# Patient Record
Sex: FEMALE | Race: WHITE | NOT HISPANIC OR LATINO | Employment: FULL TIME | URBAN - METROPOLITAN AREA
[De-identification: names, ages, dates, MRNs, and addresses within clinical notes are randomized per-mention and may not be internally consistent; named-entity substitution may affect disease eponyms.]

---

## 2017-01-12 ENCOUNTER — TRANSCRIBE ORDERS (OUTPATIENT)
Dept: ADMINISTRATIVE | Facility: HOSPITAL | Age: 57
End: 2017-01-12

## 2017-01-17 ENCOUNTER — GENERIC CONVERSION - ENCOUNTER (OUTPATIENT)
Dept: OTHER | Facility: OTHER | Age: 57
End: 2017-01-17

## 2017-02-06 ENCOUNTER — ALLSCRIPTS OFFICE VISIT (OUTPATIENT)
Dept: OTHER | Facility: OTHER | Age: 57
End: 2017-02-06

## 2017-02-08 ENCOUNTER — LAB CONVERSION - ENCOUNTER (OUTPATIENT)
Dept: OTHER | Facility: OTHER | Age: 57
End: 2017-02-08

## 2017-02-08 LAB — ENDOMETRIAL BIOPSY (HISTORICAL): NORMAL

## 2017-02-09 ENCOUNTER — GENERIC CONVERSION - ENCOUNTER (OUTPATIENT)
Dept: OTHER | Facility: OTHER | Age: 57
End: 2017-02-09

## 2017-03-11 ENCOUNTER — HOSPITAL ENCOUNTER (OUTPATIENT)
Dept: RADIOLOGY | Facility: HOSPITAL | Age: 57
Discharge: HOME | End: 2017-03-11
Payer: COMMERCIAL

## 2017-03-11 DIAGNOSIS — Z12.31 ENCOUNTER FOR SCREENING MAMMOGRAM FOR MALIGNANT NEOPLASM OF BREAST: ICD-10-CM

## 2017-03-11 PROCEDURE — G0202 SCR MAMMO BI INCL CAD: HCPCS

## 2017-03-13 DIAGNOSIS — R92.8 OTHER ABNORMAL AND INCONCLUSIVE FINDINGS ON DIAGNOSTIC IMAGING OF BREAST: ICD-10-CM

## 2017-03-16 ENCOUNTER — GENERIC CONVERSION - ENCOUNTER (OUTPATIENT)
Dept: OTHER | Facility: OTHER | Age: 57
End: 2017-03-16

## 2017-03-31 ENCOUNTER — HOSPITAL ENCOUNTER (OUTPATIENT)
Dept: RADIOLOGY | Facility: HOSPITAL | Age: 57
Discharge: HOME | End: 2017-03-31
Payer: COMMERCIAL

## 2017-03-31 DIAGNOSIS — R92.8 MAMMOGRAM ABNORMAL: ICD-10-CM

## 2017-03-31 PROCEDURE — 76642 ULTRASOUND BREAST LIMITED: CPT

## 2017-03-31 PROCEDURE — G0204 DX MAMMO INCL CAD BI: HCPCS

## 2017-04-24 ENCOUNTER — GENERIC CONVERSION - ENCOUNTER (OUTPATIENT)
Dept: OTHER | Facility: OTHER | Age: 57
End: 2017-04-24

## 2017-08-23 ENCOUNTER — TRANSCRIBE ORDERS (OUTPATIENT)
Dept: ADMINISTRATIVE | Facility: HOSPITAL | Age: 57
End: 2017-08-23

## 2017-08-23 DIAGNOSIS — R92.8 ABNORMAL MAMMOGRAM, UNSPECIFIED: ICD-10-CM

## 2017-10-27 ENCOUNTER — HOSPITAL ENCOUNTER (OUTPATIENT)
Dept: RADIOLOGY | Facility: HOSPITAL | Age: 57
Discharge: HOME | End: 2017-10-27
Payer: COMMERCIAL

## 2017-10-27 DIAGNOSIS — R92.8 ABNORMAL MAMMOGRAM: ICD-10-CM

## 2017-10-27 PROCEDURE — G0206 DX MAMMO INCL CAD UNI: HCPCS

## 2017-10-27 PROCEDURE — 76642 ULTRASOUND BREAST LIMITED: CPT

## 2018-01-10 NOTE — MISCELLANEOUS
Message   Recorded as Task   Date: 03/13/2017 09:23 AM, Created By: Logan Schirmer   Task Name: Care Gap   Assigned To: Anali Felton   Regarding Patient: Demetra Gonzalez, Status: In Progress   Comment:    Jeannette Paulino - 13 Mar 2017 9:23 AM     TASK CREATED  Patient needs additional imaging for mammo  Thanks! [facility] - 13 Mar 2017 10:16 AM     TASK IN PROGRESS   [facility] - 13 Mar 2017 10:20 AM     TASK EDITED  order in allscripts, lft message for pt to call w/ results   [facility] - 16 Mar 2017 3:28 PM     TASK EDITED  pt aware apt made        Active Problems    1  Abnormal female pelvic exam (793 5) (Z01 411)   2  Abnormal finding on breast imaging (793 89) (R92 8)   3  Anemia (285 9) (D64 9)   4  Congenital pes planus of left foot (754 61) (Q66 52)   5  Congenital pes planus of right foot (754 61) (Q66 51)   6  Encounter for gynecological examination without abnormal finding (V72 31) (Z01 419)   7  Encounter for screening mammogram for malignant neoplasm of breast (V76 12)   (Z12 31)   8  Endometrial polyp (621 0) (N84 0)   9  Left foot pain (729 5) (M79 672)   10  Menorrhagia (626 2) (N92 0)   11  Right foot pain (729 5) (M79 671)   12  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)   13  Valgus deformity of right great toe (735 0) (M20 11)   14  Vertigo (780 4) (R42)    Current Meds   1  Deborah Low Dose 81 MG Oral Tablet Chewable; Therapy: 50Iem5841 to Recorded   2  Folic Acid 1 MG Oral Tablet; Therapy: 38Zrp9555 to Recorded   3  Meclizine HCl - 12 5 MG Oral Tablet; 1 PO BID; Therapy: 17XDW6915 to (Last Rx:21Oct2015) Ordered   4  Vitamin D 400 UNIT CAPS; Therapy: 74Hhm2560 to Recorded    Allergies    1  No Known Drug Allergies    2   No Known Latex Allergies    Signatures   Electronically signed by : Marianela Mcnamara, ; Mar 16 2017  3:28PM EST                       (Author)

## 2018-01-11 NOTE — MISCELLANEOUS
Message   Recorded as Task   Date: 01/17/2017 12:28 PM, Created By: Vanessa Stone   Task Name: Call Back   Assigned To: Vicki Andrews   Regarding Patient: Radha Maya, Status: In Progress   Comment:    Shari Crocker - 17 Jan 2017 12:28 PM     TASK CREATED  Caller: Self; Results Inquiry; [phone] (Home)  Pt calling for results from Dec bloodwork   Lv Pride - 17 Jan 2017 12:53 PM     TASK IN [place] - 17 Jan 2017 12:56 PM     TASK EDITED   Humaira Welch - 17 Jan 2017 3:27 PM     TASK IN PROGRESS   Humaira Welch - 17 Jan 2017 3:32 PM     TASK EDITED  lm on vm that cbc/diff wnl    tcb with any further questions        Active Problems    1  Abnormal female pelvic exam (793 5) (Z01 411)   2  Anemia (285 9) (D64 9)   3  Congenital pes planus of left foot (754 61) (Q66 52)   4  Congenital pes planus of right foot (754 61) (Q66 51)   5  Encounter for gynecological examination without abnormal finding (V72 31) (Z01 419)   6  Encounter for screening mammogram for malignant neoplasm of breast (V76 12)   (Z12 31)   7  Endometrial polyp (621 0) (N84 0)   8  Left foot pain (729 5) (M79 672)   9  Menorrhagia (626 2) (N92 0)   10  Right foot pain (729 5) (M79 671)   11  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)   12  Valgus deformity of right great toe (735 0) (M20 11)   13  Vertigo (780 4) (R42)    Current Meds   1  Deborah Low Dose 81 MG Oral Tablet Chewable; Therapy: 27Xxd3367 to Recorded   2  Folic Acid 1 MG Oral Tablet; Therapy: 72Slh9579 to Recorded   3  Meclizine HCl - 12 5 MG Oral Tablet; 1 PO BID; Therapy: 57LDM8218 to (Last Rx:21Oct2015) Ordered   4  Vitamin D 400 UNIT CAPS; Therapy: 65Qns9751 to Recorded    Allergies    1  No Known Drug Allergies    2   No Known Latex Allergies    Signatures   Electronically signed by : Jones Bentley, ; Jan 17 2017  3:32PM EST                       (Author)

## 2018-01-12 NOTE — MISCELLANEOUS
Message   Recorded as Task   Date: 04/24/2017 02:54 PM, Created By: Ying Vieira   Task Name: Call Back   Assigned To: Gaye Patel GYN,Team   Regarding Patient: Lawson Arellano, Status: In Progress   Comment:    AlejandrosummerloveShari - 24 Apr 2017 2:54 PM     TASK CREATED  Caller: Self; Results Inquiry; [phone] (Home)  Pt would like to speak with a nurse about the results of recent mammo  Pt got a call about scheduling again in 6 mo's and pt is confused as to why  Linda Story - 24 Apr 2017 2:57 PM     TASK IN PROGRESS   Linda Story - 24 Apr 2017 2:58 PM     TASK EDITED  LMOM to cb    Linda Story - 24 Apr 2017 3:44 PM     TASK EDITED  Explained the reason why she has to go back in 6 months  Wanted to know why it had to be a mammo an not an u/s - stated it was due to insurances purposes  Active Problems    1  Abnormal female pelvic exam (793 5) (Z01 411)   2  Abnormal finding on breast imaging (793 89) (R92 8)   3  Anemia (285 9) (D64 9)   4  Congenital pes planus of left foot (754 61) (Q66 52)   5  Congenital pes planus of right foot (754 61) (Q66 51)   6  Encounter for gynecological examination without abnormal finding (V72 31) (Z01 419)   7  Encounter for screening mammogram for malignant neoplasm of breast (V76 12)   (Z12 31)   8  Endometrial polyp (621 0) (N84 0)   9  Left foot pain (729 5) (M79 672)   10  Menorrhagia (626 2) (N92 0)   11  Right foot pain (729 5) (M79 671)   12  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)   13  Valgus deformity of right great toe (735 0) (M20 11)   14  Vertigo (780 4) (R42)    Current Meds   1  Deborah Low Dose 81 MG Oral Tablet Chewable; Therapy: 16Npj4343 to Recorded   2  Folic Acid 1 MG Oral Tablet; Therapy: 45Ake6195 to Recorded   3  Meclizine HCl - 12 5 MG Oral Tablet; 1 PO BID; Therapy: 07LDD4555 to (Last Rx:21Oct2015) Ordered   4  Vitamin D 400 UNIT CAPS; Therapy: 09Kdb5392 to Recorded    Allergies    1  No Known Drug Allergies    2  No Known Latex Allergies    Signatures   Electronically signed by : Mohit Hendricks, ; Apr 24 2017  3:44PM EST                       (Author)

## 2018-01-13 NOTE — MISCELLANEOUS
Message   Recorded as Task   Date: 02/08/2017 02:03 PM, Created By: [address]   Task Name: Follow Up   Assigned To: Gregory Rodrigues   Regarding Patient: Dorothy De La Rosa, Status: In Progress   Comment:    JeffersonJeannette - 08 Feb 2017 2:03 PM     TASK CREATED  LM for patient about D&C results - she had a benign polyp! Nothing further to do in terms of follow up  Routine care moving forward  Hayden Hawkins - 08 Feb 2017 3:01 PM     TASK IN PROGRESS   Hayden Hawkins - 09 Feb 2017 9:05 AM     TASK EDITED  Pt aware        Active Problems    1  Abnormal female pelvic exam (793 5) (Z01 411)   2  Anemia (285 9) (D64 9)   3  Congenital pes planus of left foot (754 61) (Q66 52)   4  Congenital pes planus of right foot (754 61) (Q66 51)   5  Encounter for gynecological examination without abnormal finding (V72 31) (Z01 419)   6  Encounter for screening mammogram for malignant neoplasm of breast (V76 12)   (Z12 31)   7  Endometrial polyp (621 0) (N84 0)   8  Left foot pain (729 5) (M79 672)   9  Menorrhagia (626 2) (N92 0)   10  Right foot pain (729 5) (M79 671)   11  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

## 2018-03-08 ENCOUNTER — OFFICE VISIT (OUTPATIENT)
Dept: OBGYN CLINIC | Facility: CLINIC | Age: 58
End: 2018-03-08
Payer: COMMERCIAL

## 2018-03-08 VITALS
HEIGHT: 66 IN | DIASTOLIC BLOOD PRESSURE: 80 MMHG | WEIGHT: 129.4 LBS | SYSTOLIC BLOOD PRESSURE: 110 MMHG | BODY MASS INDEX: 20.79 KG/M2

## 2018-03-08 DIAGNOSIS — Z12.31 ENCOUNTER FOR SCREENING MAMMOGRAM FOR MALIGNANT NEOPLASM OF BREAST: ICD-10-CM

## 2018-03-08 DIAGNOSIS — Z01.419 ENCOUNTER FOR ANNUAL ROUTINE GYNECOLOGICAL EXAMINATION: Primary | ICD-10-CM

## 2018-03-08 PROBLEM — R92.8 ABNORMAL FINDING ON BREAST IMAGING: Status: ACTIVE | Noted: 2017-03-13

## 2018-03-08 PROCEDURE — G0145 SCR C/V CYTO,THINLAYER,RESCR: HCPCS | Performed by: PATHOLOGY

## 2018-03-08 PROCEDURE — 88141 CYTOPATH C/V INTERPRET: CPT | Performed by: PATHOLOGY

## 2018-03-08 PROCEDURE — 99396 PREV VISIT EST AGE 40-64: CPT | Performed by: OBSTETRICS & GYNECOLOGY

## 2018-03-08 PROCEDURE — 87624 HPV HI-RISK TYP POOLED RSLT: CPT | Performed by: OBSTETRICS & GYNECOLOGY

## 2018-03-08 RX ORDER — ASPIRIN 81 MG/1
TABLET, CHEWABLE ORAL
COMMUNITY
Start: 2013-08-23 | End: 2019-09-03 | Stop reason: ALTCHOICE

## 2018-03-09 NOTE — PROGRESS NOTES
Assessment/Plan:    No problem-specific Assessment & Plan notes found for this encounter  Diagnoses and all orders for this visit:    Encounter for screening mammogram for malignant neoplasm of breast  -     Cancel: Mammo screening bilateral w cad; Future  -     Cancel: Mammo diagnostic left w 3d & cad; Future  -     US breast left limited (diagnostic)  -     Mammo screening bilateral w 3d & cad; Future    Encounter for annual routine gynecological examination  -     Liquid-based pap, screening    Other orders  -     aspirin (NORBERT LOW DOSE) 81 mg chewable tablet; Chew        Subjective:      Patient ID: Yesenia Duong is a 62 y o  female  [place] is here today for annual exam    She is overall doing well  The last time she was seen in the office was for a D&C, Hysteroscopy for endometrial polyp  After she healed - she had no further bleeding  She denies pelvic pain, vaginal discharge, vaginal dryness  She has very occasional YANELY - not affecting quality of life  No bowel concerns  Currently single, broke up with boyfriend in last year  Daughter is studying abroad in [place] - they will be going to visit her end of March! Son is Sr  In  - figuring out what college now  Denies other major changes to medical history  Last pap 2012 - due this year  Mammogram - irregular, needs screening/left breast US - very dense breasts  The following portions of the patient's history were reviewed and updated as appropriate: allergies, current medications, past family history, past medical history, past social history, past surgical history and problem list     Review of Systems   Constitutional: Negative  HENT: Negative  Respiratory: Negative  Cardiovascular: Negative  Gastrointestinal: Negative  Genitourinary: Negative for difficulty urinating, dyspareunia, pelvic pain, vaginal bleeding, vaginal discharge and vaginal pain  Musculoskeletal: Negative  Neurological: Negative  Psychiatric/Behavioral: Negative  All other systems reviewed and are negative  Objective:      /80   Ht 5' 6" (1 676 m)   Wt 58 7 kg (129 lb 6 4 oz)   BMI 20 89 kg/m²          Physical Exam   Constitutional: She appears well-developed and well-nourished  No distress  Pulmonary/Chest: No respiratory distress  Abdominal: Soft  There is no tenderness  Genitourinary: Vagina normal and uterus normal  No breast swelling, tenderness or discharge  There is no rash or lesion on the right labia  There is no rash or lesion on the left labia  Cervix exhibits no motion tenderness and no discharge  Right adnexum displays no mass and no tenderness  Left adnexum displays no mass and no tenderness  Musculoskeletal: She exhibits no edema  Neurological: She is alert  Skin: Skin is warm and dry  Psychiatric: She has a normal mood and affect  Vitals reviewed

## 2018-03-12 LAB — HPV RRNA GENITAL QL NAA+PROBE: ABNORMAL

## 2018-03-13 LAB
LAB AP GYN PRIMARY INTERPRETATION: NORMAL
Lab: NORMAL
PATH INTERP SPEC-IMP: NORMAL

## 2018-03-14 ENCOUNTER — TELEPHONE (OUTPATIENT)
Dept: OBGYN CLINIC | Facility: CLINIC | Age: 58
End: 2018-03-14

## 2018-03-14 NOTE — TELEPHONE ENCOUNTER
Patient is aware of pap results and that she needs a colpo  Explained what a colpo is and how it is done  Advised her to take 2-3 Ibuprofen an hour before hand and to eat something to avoid becoming light headed   Scheduled the Colpo for 04/10/18 with Ct7 at 2:20pm

## 2018-03-14 NOTE — TELEPHONE ENCOUNTER
----- Message from Jennifer Torre MD sent at 3/13/2018  4:27 PM EDT -----  Can you please let Judy Allen know that her pap was mildly abnormal - and I would recommend she get scheduled for colposcopy to take a closer look  Thanks!

## 2018-03-29 ENCOUNTER — HOSPITAL ENCOUNTER (OUTPATIENT)
Dept: RADIOLOGY | Facility: HOSPITAL | Age: 58
Discharge: HOME | End: 2018-03-29
Attending: OBSTETRICS & GYNECOLOGY
Payer: COMMERCIAL

## 2018-03-29 DIAGNOSIS — Z12.31 ENCOUNTER FOR SCREENING MAMMOGRAM FOR MALIGNANT NEOPLASM OF BREAST: ICD-10-CM

## 2018-03-29 PROCEDURE — 77063 BREAST TOMOSYNTHESIS BI: CPT

## 2018-03-29 PROCEDURE — 77067 SCR MAMMO BI INCL CAD: CPT

## 2018-04-10 ENCOUNTER — PROCEDURE VISIT (OUTPATIENT)
Dept: OBGYN CLINIC | Facility: CLINIC | Age: 58
End: 2018-04-10
Payer: COMMERCIAL

## 2018-04-10 VITALS — BODY MASS INDEX: 21.24 KG/M2 | DIASTOLIC BLOOD PRESSURE: 60 MMHG | WEIGHT: 131.6 LBS | SYSTOLIC BLOOD PRESSURE: 100 MMHG

## 2018-04-10 DIAGNOSIS — R87.610 ASCUS WITH POSITIVE HIGH RISK HPV CERVICAL: Primary | ICD-10-CM

## 2018-04-10 DIAGNOSIS — R87.810 ASCUS WITH POSITIVE HIGH RISK HPV CERVICAL: Primary | ICD-10-CM

## 2018-04-10 PROCEDURE — 88305 TISSUE EXAM BY PATHOLOGIST: CPT | Performed by: PATHOLOGY

## 2018-04-10 PROCEDURE — 88344 IMHCHEM/IMCYTCHM EA MLT ANTB: CPT | Performed by: PATHOLOGY

## 2018-04-10 PROCEDURE — 57454 BX/CURETT OF CERVIX W/SCOPE: CPT | Performed by: OBSTETRICS & GYNECOLOGY

## 2018-04-10 NOTE — PATIENT INSTRUCTIONS
Colposcopy   WHAT YOU NEED TO KNOW:   You may have light bleeding or spotting after the procedure  If a biopsy was taken, you may have cramping and bleeding for several days  If medicine was used to control bleeding, you may have brown or black discharge  DISCHARGE INSTRUCTIONS:   Seek care immediately if:   · You have severe pain in your lower abdomen  · You soak through 1 sanitary pad in 1 hour or less  · You feel weak, dizzy, or faint  Contact your healthcare provider if:   · You have a fever, chills, or foul-smelling discharge  · You have bleeding with clots  · Your pain gets worse or does not get better after you take pain medicine  · You have questions or concerns about your condition or care  Medicines:   · NSAIDs , such as ibuprofen, help decrease swelling, pain, and fever  NSAIDs can cause stomach bleeding or kidney problems in certain people  If you take blood thinner medicine, always ask your healthcare provider if NSAIDs are safe for you  Always read the medicine label and follow directions  · Take your medicine as directed  Contact your healthcare provider if you think your medicine is not helping or if you have side effects  Tell him or her if you are allergic to any medicine  Keep a list of the medicines, vitamins, and herbs you take  Include the amounts, and when and why you take them  Bring the list or the pill bottles to follow-up visits  Carry your medicine list with you in case of an emergency  Activity:  If no biopsy was taken, you can resume your usual activities after the procedure  If a biopsy was taken, rest as directed  Do not exercise, play sports, or lift anything heavier than 5 pounds  Ask your healthcare provider when you can return to your usual activities  Do not put anything in your vagina for 2 weeks: If a biopsy was taken, do not douche, use medicines in your vagina, or have sex  Do not use tampons  Instead, wear a sanitary pad for bleeding     Follow up with your healthcare provider as directed:  Write down your questions so you remember to ask them during your visits  © 2017 [employer] Information is for End User's use only and may not be sold, redistributed or otherwise used for commercial purposes  All illustrations and images included in CareNotes® are the copyrighted property of A D A M , Inc  or [employer]  The above information is an  only  It is not intended as medical advice for individual conditions or treatments  Talk to your doctor, nurse or pharmacist before following any medical regimen to see if it is safe and effective for you

## 2018-04-10 NOTE — PROGRESS NOTES
Colposcopy  Date/Time: 4/10/2018 4:17 PM  Performed by: Kaela Romero  Authorized by: Kaela Romero     Consent:     Consent obtained:  Verbal    Consent given by:  Patient    Procedural risks discussed:  Bleeding, infection, failure rate and repeat procedure    Patient questions answered: yes      Patient agrees, verbalizes understanding, and wants to proceed: yes    Pre-procedure:     Prepped with: acetic acid    Indication:     Indication:  ASC-US  Procedure:     Procedure: Colposcopy w/ cervical biopsy and ECC      Under satisfactory analgesia the patient was prepped and draped in the dorsal lithotomy position: yes      [place] speculum was placed in the vagina: yes      Under colposcopic examination the transition zone was seen in entirety: yes      Endocervix was curetted using a Kevorkian curette: yes      Cervical biopsy performed with a cervical biopsy punch: yes      Monsel's solution was applied: yes      Biopsy(s): yes      Location:  12, 6    Specimen to pathology: yes    Post-procedure:     Findings: White epithelium      Impression: Low grade cervical dysplasia      Patient tolerance of procedure: Tolerated well, no immediate complications  Comments:      /60   Wt 59 7 kg (131 lb 9 6 oz)   BMI 21 24 kg/m²     Reviewed potential outcomes with the patient  All patient questions answered

## 2018-04-20 ENCOUNTER — TELEPHONE (OUTPATIENT)
Dept: OBGYN CLINIC | Facility: CLINIC | Age: 58
End: 2018-04-20

## 2018-04-20 NOTE — TELEPHONE ENCOUNTER
----- Message from Jana Law MD sent at 4/20/2018  3:25 PM EDT -----  Can you let Daysi Lisa know that her colpo biopsies were low grade changes - will follow up with repeat pap next year

## 2018-04-27 ENCOUNTER — TRANSCRIBE ORDERS (OUTPATIENT)
Dept: ADMINISTRATIVE | Facility: HOSPITAL | Age: 58
End: 2018-04-27

## 2018-04-27 ENCOUNTER — HOSPITAL ENCOUNTER (OUTPATIENT)
Dept: RADIOLOGY | Facility: HOSPITAL | Age: 58
Discharge: HOME | End: 2018-04-27
Attending: OBSTETRICS & GYNECOLOGY
Payer: COMMERCIAL

## 2018-04-27 PROCEDURE — 76642 ULTRASOUND BREAST LIMITED: CPT

## 2018-04-30 DIAGNOSIS — D24.2 ADENOMA OF LEFT BREAST: Primary | ICD-10-CM

## 2018-10-23 ENCOUNTER — TELEPHONE (OUTPATIENT)
Dept: OBGYN CLINIC | Facility: CLINIC | Age: 58
End: 2018-10-23

## 2018-10-23 DIAGNOSIS — D24.2 FIBROADENOMA, LEFT: Primary | ICD-10-CM

## 2018-11-01 ENCOUNTER — TELEPHONE (OUTPATIENT)
Dept: OBGYN CLINIC | Facility: CLINIC | Age: 58
End: 2018-11-01

## 2018-11-01 NOTE — TELEPHONE ENCOUNTER
Calixto Auguste from [address] [place] [address], patient is scheduled for L breast u/s tomorrow morning and needs a referral   She gave me all the info:  NPI: [id number]  Diagnosis Code: D24 2  CPT: 13304, she said you can add 9949 to it incase anything changes? Pt has new insurance: [place], [place] number [id number]    She asks that you fax it to [phone] Attn: Calixto Augutse  Her number is

## 2018-11-02 ENCOUNTER — TRANSCRIBE ORDERS (OUTPATIENT)
Dept: ADMINISTRATIVE | Facility: HOSPITAL | Age: 58
End: 2018-11-02

## 2018-11-02 ENCOUNTER — HOSPITAL ENCOUNTER (OUTPATIENT)
Dept: RADIOLOGY | Facility: HOSPITAL | Age: 58
Discharge: HOME | End: 2018-11-02
Attending: OBSTETRICS & GYNECOLOGY
Payer: COMMERCIAL

## 2018-11-02 DIAGNOSIS — D24.2 FIBROADENOMA, LEFT: ICD-10-CM

## 2018-11-02 PROCEDURE — 76642 ULTRASOUND BREAST LIMITED: CPT

## 2018-11-02 NOTE — TELEPHONE ENCOUNTER
Pt needs to establish care with a pcp and they have to do the referral, explained to pt she will call [facility] in [place]

## 2018-11-06 ENCOUNTER — TELEPHONE (OUTPATIENT)
Dept: OBGYN CLINIC | Facility: CLINIC | Age: 58
End: 2018-11-06

## 2018-11-06 DIAGNOSIS — R92.2 DENSE BREAST TISSUE: Primary | ICD-10-CM

## 2018-11-06 NOTE — TELEPHONE ENCOUNTER
Patient returned call - she is aware of u/s results and that she needs to have a repeat in 6 months  Mailed order

## 2018-11-06 NOTE — TELEPHONE ENCOUNTER
----- Message from Cristo Ribera MD sent at 11/6/2018  9:31 AM EST -----  Left breast US 6 months  Thanks!

## 2019-06-21 ENCOUNTER — TELEPHONE (OUTPATIENT)
Dept: OBGYN CLINIC | Facility: CLINIC | Age: 59
End: 2019-06-21

## 2019-07-11 ENCOUNTER — HOSPITAL ENCOUNTER (OUTPATIENT)
Dept: MAMMOGRAPHY | Facility: CLINIC | Age: 59
Discharge: HOME | End: 2019-07-11
Payer: COMMERCIAL

## 2019-07-11 ENCOUNTER — HOSPITAL ENCOUNTER (OUTPATIENT)
Dept: ULTRASOUND IMAGING | Facility: CLINIC | Age: 59
Discharge: HOME | End: 2019-07-11
Payer: COMMERCIAL

## 2019-07-11 VITALS — HEIGHT: 66 IN | WEIGHT: 128 LBS | BODY MASS INDEX: 20.57 KG/M2

## 2019-07-11 DIAGNOSIS — Z09 FOLLOW-UP EXAM, 3-6 MONTHS SINCE PREVIOUS EXAM: ICD-10-CM

## 2019-07-11 DIAGNOSIS — Z12.39 BREAST SCREENING: ICD-10-CM

## 2019-07-11 PROCEDURE — 77067 SCR MAMMO BI INCL CAD: CPT

## 2019-07-11 PROCEDURE — 77063 BREAST TOMOSYNTHESIS BI: CPT

## 2019-07-11 PROCEDURE — 76642 ULTRASOUND BREAST LIMITED: CPT

## 2019-09-03 ENCOUNTER — ANNUAL EXAM (OUTPATIENT)
Dept: OBGYN CLINIC | Facility: CLINIC | Age: 59
End: 2019-09-03
Payer: COMMERCIAL

## 2019-09-03 VITALS
SYSTOLIC BLOOD PRESSURE: 114 MMHG | WEIGHT: 123 LBS | DIASTOLIC BLOOD PRESSURE: 62 MMHG | HEIGHT: 66 IN | BODY MASS INDEX: 19.77 KG/M2

## 2019-09-03 DIAGNOSIS — Z01.419 ENCNTR FOR GYN EXAM (GENERAL) (ROUTINE) W/O ABN FINDINGS: Primary | ICD-10-CM

## 2019-09-03 DIAGNOSIS — R82.90 BAD ODOR OF URINE: ICD-10-CM

## 2019-09-03 DIAGNOSIS — Z12.11 COLON CANCER SCREENING: ICD-10-CM

## 2019-09-03 DIAGNOSIS — Z12.31 ENCOUNTER FOR SCREENING MAMMOGRAM FOR MALIGNANT NEOPLASM OF BREAST: ICD-10-CM

## 2019-09-03 DIAGNOSIS — Z11.51 SCREENING FOR HUMAN PAPILLOMAVIRUS (HPV): ICD-10-CM

## 2019-09-03 PROBLEM — N87.0 CIN I (CERVICAL INTRAEPITHELIAL NEOPLASIA I): Status: ACTIVE | Noted: 2018-04-10

## 2019-09-03 PROCEDURE — G0145 SCR C/V CYTO,THINLAYER,RESCR: HCPCS | Performed by: OBSTETRICS & GYNECOLOGY

## 2019-09-03 PROCEDURE — 87624 HPV HI-RISK TYP POOLED RSLT: CPT | Performed by: OBSTETRICS & GYNECOLOGY

## 2019-09-03 PROCEDURE — S0612 ANNUAL GYNECOLOGICAL EXAMINA: HCPCS | Performed by: OBSTETRICS & GYNECOLOGY

## 2019-09-03 PROCEDURE — 87086 URINE CULTURE/COLONY COUNT: CPT | Performed by: OBSTETRICS & GYNECOLOGY

## 2019-09-03 NOTE — PROGRESS NOTES
Rachelle Brandon   1960    CC:  Yearly exam    S:  61 y o  female here for yearly exam  She is postmenopausal and has had no vaginal bleeding  She denies vaginal discharge, itching, or odor  She is sexually active with new partner and does note some dryness - has used olive oil  Recently moved to [facility]  Daughter is graduated and in [place]  Son is in second year of college  Had a wart on roof of her mouth - likely related to HPV  She does not some urinary odor and cloudiness on occasion  No vaginal concerns  She also notes some issues with emptying her bowels completely, she is due for a colonoscopy this year, referral for GI provided  Last Pap 3/2018 (ASCUS, pos HPV), [place] 2018 - URSULA 1  Last Mammo  19 - benign  Last Colonoscopy 14  - 5 year recall      Current Outpatient Medications:     BIOTIN PO, Take by mouth, Disp: , Rfl:   Past Medical History:   Diagnosis Date    Abnormal Pap smear of cervix 2018    ASCUS +HPV      HPV (human papilloma virus) infection 2018     Past Surgical History:   Procedure Laterality Date    COLONOSCOPY  2014    COLONOSCOPY      COLPOSCOPY  04/10/2018    URSULA 1    DILATION AND CURETTAGE OF UTERUS      polyps    OTHER SURGICAL HISTORY      surgical treatment of missed      Family History   Problem Relation Age of Onset    Arthritis Mother     Intestinal polyp Mother         large intestine    Varicose Veins Mother     Alcohol abuse Father     Arthritis Sister     Arthritis Maternal Grandmother     Diabetes Maternal Grandmother     Heart attack Other     Hiatal hernia Other     No Known Problems Daughter     No Known Problems Maternal Grandfather     No Known Problems Paternal Grandmother     No Known Problems Paternal Grandfather     No Known Problems Sister     Colon cancer Maternal Aunt     No Known Problems Maternal Aunt     Crohn's disease Neg Hx     Liver cancer Neg Hx         O:  Blood pressure 114/62, height 5' 5 5" (1 664 m), weight 55 8 kg (123 lb)  Patient appears well and is not in distress  Breasts are symmetrical without mass, tenderness, nipple discharge, skin changes or adenopathy  Abdomen is soft and nontender without masses  External genitals are normal without lesions or rashes  Vagina is normal without discharge or bleeding  Cervix is normal without discharge or lesion  Uterus is normal, mobile, nontender without palpable mass  Adnexa are normal, nontender, without palpable mass  A:  Yearly exam      P:  RTO one year for yearly exam or sooner as needed  Pap and HPV collected  Mammo 7/2020  Will check urine culture due to her urinary symptoms  Referral from GI placed

## 2019-09-04 ENCOUNTER — TELEPHONE (OUTPATIENT)
Dept: OBGYN CLINIC | Facility: CLINIC | Age: 59
End: 2019-09-04

## 2019-09-04 LAB
BACTERIA UR CULT: NORMAL
HPV HR 12 DNA CVX QL NAA+PROBE: NEGATIVE
HPV16 DNA CVX QL NAA+PROBE: NEGATIVE
HPV18 DNA CVX QL NAA+PROBE: NEGATIVE

## 2019-09-04 NOTE — TELEPHONE ENCOUNTER
----- Message from Daly Dubon MD sent at 9/4/2019  1:50 PM EDT -----  Can you let Sally Keita know that her urine culture came back negative

## 2019-09-05 LAB
LAB AP GYN PRIMARY INTERPRETATION: NORMAL
Lab: NORMAL

## 2019-10-10 ENCOUNTER — APPOINTMENT (OUTPATIENT)
Dept: LAB | Facility: HOSPITAL | Age: 59
End: 2019-10-10
Payer: COMMERCIAL

## 2019-10-10 DIAGNOSIS — N39.0 URINARY TRACT INFECTION WITHOUT HEMATURIA, SITE UNSPECIFIED: Primary | ICD-10-CM

## 2019-10-10 DIAGNOSIS — N39.0 URINARY TRACT INFECTION WITHOUT HEMATURIA, SITE UNSPECIFIED: ICD-10-CM

## 2019-10-10 LAB
BACTERIA UR QL AUTO: ABNORMAL /HPF
BILIRUB UR QL STRIP: NEGATIVE
CLARITY UR: ABNORMAL
COLOR UR: YELLOW
GLUCOSE UR STRIP-MCNC: NEGATIVE MG/DL
HGB UR QL STRIP.AUTO: NEGATIVE
KETONES UR STRIP-MCNC: NEGATIVE MG/DL
LEUKOCYTE ESTERASE UR QL STRIP: ABNORMAL
NITRITE UR QL STRIP: POSITIVE
NON-SQ EPI CELLS URNS QL MICRO: ABNORMAL /HPF
PH UR STRIP.AUTO: 7 [PH]
PROT UR STRIP-MCNC: NEGATIVE MG/DL
RBC #/AREA URNS AUTO: ABNORMAL /HPF
SP GR UR STRIP.AUTO: 1.01 (ref 1–1.03)
UROBILINOGEN UR QL STRIP.AUTO: 1 E.U./DL
WBC #/AREA URNS AUTO: ABNORMAL /HPF

## 2019-10-10 PROCEDURE — 87086 URINE CULTURE/COLONY COUNT: CPT

## 2019-10-10 PROCEDURE — 87186 SC STD MICRODIL/AGAR DIL: CPT

## 2019-10-10 PROCEDURE — 81001 URINALYSIS AUTO W/SCOPE: CPT

## 2019-10-10 PROCEDURE — 87077 CULTURE AEROBIC IDENTIFY: CPT

## 2019-10-10 RX ORDER — SULFAMETHOXAZOLE AND TRIMETHOPRIM 800; 160 MG/1; MG/1
TABLET ORAL
Qty: 6 TABLET | Refills: 0 | Status: SHIPPED | OUTPATIENT
Start: 2019-10-10 | End: 2019-10-12

## 2019-10-10 NOTE — TELEPHONE ENCOUNTER
Called pt - informed of dr Elmer Justice recommendations- ua, C & S ordered to lab  Bactrim DS BID for 3 days ordered to pharmacy  Advised pt to go to [facility] lab first, then call pharmacy prior to picking up med    NKDA

## 2019-10-10 NOTE — TELEPHONE ENCOUNTER
Pt called with c/o" foul smelling , cloudy urine " also feels like she is not emptying her bladder completely " States "when  she lies down,she feels pressure to void " she had a urine culture done 9/4/19 , which was wnl  Routed to provider   Please advise

## 2019-10-11 ENCOUNTER — TELEPHONE (OUTPATIENT)
Dept: OBGYN CLINIC | Facility: CLINIC | Age: 59
End: 2019-10-11

## 2019-10-12 LAB — BACTERIA UR CULT: ABNORMAL

## 2019-12-26 ENCOUNTER — CLINICAL SUPPORT (OUTPATIENT)
Dept: GASTROENTEROLOGY | Facility: CLINIC | Age: 59
End: 2019-12-26

## 2019-12-26 VITALS — BODY MASS INDEX: 20.09 KG/M2 | HEIGHT: 66 IN | WEIGHT: 125 LBS

## 2019-12-26 DIAGNOSIS — Z83.71 FAMILY HISTORY OF COLONIC POLYPS: Primary | ICD-10-CM

## 2019-12-26 NOTE — PROGRESS NOTES
Pt seen for colon prep dx family history of colon polpys  Meds reviewed with pt  Instructions given  Screening form signed  Suprep rx sent to pharmacy   Colon bmec 12/31/19 GS

## 2020-02-13 ENCOUNTER — TELEPHONE (OUTPATIENT)
Dept: OBGYN CLINIC | Facility: CLINIC | Age: 60
End: 2020-02-13

## 2020-02-13 DIAGNOSIS — R39.9 UTI SYMPTOMS: Primary | ICD-10-CM

## 2020-02-13 NOTE — TELEPHONE ENCOUNTER
Pt called office today, left voicemail message stating "she has an infection, wants Rx called in "  Pt saw Dr Nikki Lay on 9/3/19 (yearly exam)  Pt can be reached @ [phone]

## 2020-02-13 NOTE — TELEPHONE ENCOUNTER
Pt called with symptoms of urinary odor, cloudy urine & a slow stream " denies burning , pain , frequency or urgency when urinating  Advised pt to have a UA & C & S done prior to taking an antibiotic  Pt states she is going to wait until she has other symptoms before spending the time & money for urine culture  & the closest lab is one hour from her residence  Informed pt - our policy is to have urine specimen done  prior to taking Rx  Orders were entered in epic  Pt to call when she reports to lab, in order for lab slips to be fax'd

## 2020-02-14 ENCOUNTER — TELEPHONE (OUTPATIENT)
Dept: OBGYN CLINIC | Facility: CLINIC | Age: 60
End: 2020-02-14

## 2020-02-14 NOTE — TELEPHONE ENCOUNTER
Pt called - per her request lab orders for UA & C & S fax'd to [facility]  Pt to go for urine specimen this Monday, 2/17  Advised pt to call after she leaves lab, as we could order an antibiotic , per providers protocol  Also, informed pt to report to urgent care or ready care if symptoms worsen over the weekend  Pt verbalized understanding

## 2022-06-14 ENCOUNTER — TELEPHONE (OUTPATIENT)
Dept: INTERNAL MEDICINE CLINIC | Facility: CLINIC | Age: 62
End: 2022-06-14

## 2025-08-15 ENCOUNTER — VBI (OUTPATIENT)
Dept: ADMINISTRATIVE | Facility: OTHER | Age: 65
End: 2025-08-15

## 2025-08-21 PROBLEM — E55.9 VITAMIN D DEFICIENCY, UNSPECIFIED: Status: ACTIVE | Noted: 2025-08-21

## 2025-08-21 PROBLEM — R92.30 DENSE BREAST TISSUE: Status: ACTIVE | Noted: 2025-08-21

## 2025-08-21 PROBLEM — M81.0 AGE-RELATED OSTEOPOROSIS WITHOUT CURRENT PATHOLOGICAL FRACTURE: Status: ACTIVE | Noted: 2025-08-21

## 2025-08-21 PROBLEM — E87.5 HYPERKALEMIA: Status: ACTIVE | Noted: 2025-08-21

## 2025-08-21 PROBLEM — Z12.83 ENCOUNTER FOR SCREENING FOR MALIGNANT NEOPLASM OF SKIN: Status: ACTIVE | Noted: 2025-08-21

## 2025-08-21 PROBLEM — N72 INFLAMMATORY DISEASE OF CERVIX UTERI: Status: ACTIVE | Noted: 2025-08-21

## 2025-08-21 PROBLEM — R87.810 HIGH-RISK HUMAN PAPILLOMAVIRUS (HPV) DNA DETECTED IN CERVICAL SPECIMEN: Status: ACTIVE | Noted: 2025-08-21
